# Patient Record
Sex: FEMALE | Race: WHITE | NOT HISPANIC OR LATINO | Employment: FULL TIME | URBAN - METROPOLITAN AREA
[De-identification: names, ages, dates, MRNs, and addresses within clinical notes are randomized per-mention and may not be internally consistent; named-entity substitution may affect disease eponyms.]

---

## 2024-07-23 PROBLEM — IMO0002 LUPUS: Status: ACTIVE | Noted: 2024-07-23

## 2024-07-23 PROBLEM — M32.9 LUPUS: Status: ACTIVE | Noted: 2024-07-23

## 2024-09-16 ENCOUNTER — TELEPHONE (OUTPATIENT)
Age: 44
End: 2024-09-16
Payer: COMMERCIAL

## 2024-09-18 ENCOUNTER — OFFICE VISIT (OUTPATIENT)
Age: 44
End: 2024-09-18
Payer: COMMERCIAL

## 2024-09-18 VITALS
WEIGHT: 218.8 LBS | HEIGHT: 63 IN | TEMPERATURE: 97.9 F | HEART RATE: 80 BPM | BODY MASS INDEX: 38.77 KG/M2 | DIASTOLIC BLOOD PRESSURE: 78 MMHG | SYSTOLIC BLOOD PRESSURE: 130 MMHG

## 2024-09-18 DIAGNOSIS — M65.4 TENDINITIS, DE QUERVAIN'S: ICD-10-CM

## 2024-09-18 DIAGNOSIS — IMO0002 LUPUS: Primary | ICD-10-CM

## 2024-09-18 RX ORDER — METOPROLOL SUCCINATE 100 MG/1
100 TABLET, EXTENDED RELEASE ORAL DAILY
COMMUNITY

## 2024-09-18 RX ORDER — HYDROXYCHLOROQUINE SULFATE 200 MG/1
200 TABLET, FILM COATED ORAL 2 TIMES DAILY
Qty: 180 TABLET | Refills: 1 | Status: SHIPPED | OUTPATIENT
Start: 2024-09-18 | End: 2025-01-20

## 2024-09-18 RX ORDER — METHYLPREDNISOLONE 4 MG/1
TABLET ORAL
Qty: 1 EACH | Refills: 1 | Status: SHIPPED | OUTPATIENT
Start: 2024-09-18

## 2024-09-18 RX ORDER — BUSPIRONE HYDROCHLORIDE 5 MG/1
TABLET ORAL
COMMUNITY

## 2024-09-18 NOTE — PROGRESS NOTES
[facility] [place] [facility] Follow Up Visit     Office Follow Up      Date: 09/18/2024   Patient Name: Lizette Proctor  MRN: [id number]  YOB: 1980    Referring Physician: Louis De Leon II*     No chief complaint on file.      History of Present Illness: Lizette Proctor is a 44 y.o. female who is here today for follow up on   History of Present Illness  The patient presents for evaluation of multiple medical concerns.    She experiences morning stiffness that lasts for 3 hours, typically subsiding by the time she arrives at work. Her pain level is manageable, peaking at a score of 3, and does not necessitate prescription pain medication. She continues her Plaquenil regimen. Her last lab tests were conducted 2 to 3 months ago.    She has been prescribed Synthroid by her primary care physician (PCP) due to thyroid issues and is scheduled for a follow-up in a month. Despite taking the lowest dose of Synthroid, she reports no noticeable improvement. She also takes an antacid, ensuring a 4-hour gap between it and the Synthroid.    She has been experiencing a lupus flare-up for over a month, with symptoms including fatigue, nausea, and rashes on her cheeks and the back of her neck. These symptoms are not sun-induced and have been intermittent for over a month. She has previously tried a low-dose corticosteroid cream without significant relief. She reports no pleurisy or recurrent nose or mouth ulcers. She can produce saliva and tears. In cold weather, her fingers and toes turn red or white, and her outer toes become numb. She reports no hair loss. She was diagnosed with lupus via a biopsy behind her right ear. She uses CeraVe 50 sunscreen on her face and arms, despite not having recent sun exposure. She believes anxiety contributes to her facial redness. She has tried Blue Lizard sunscreen and found it beneficial. Sun exposure exacerbates her sores, causing a burning  sensation even after just 3 minutes.    She reports frequent urination and bowel movements. She is not currently taking vitamin D supplements but plans to start soon. She had to reschedule her eye doctor appointment last week and is now seeking a new doctor in [place] or considering traveling to [place] for an OCT exam.    Over the past 3 weeks, she has noticed a change in her left second MCP joint. It remains swollen but the swelling has reduced and the pain is not constant. She reports tenderness in her thumb knuckle and slightly above it.       Result Review :        Results  Laboratory Studies  Hepatitis panel was negative. ARABLELA, lupus screen was negative. RNP, secondary lupus test was negative. CBC: white count was good, not anemic, platelets were fine. CMP: kidney function is great, liver functions were slightly elevated. Complements in DNA were normal. Urinalysis: no protein or blood. SSA, SSB and the Smith tests were negative. Thyroid was underactive. Vitamin D was low.          Subjective     Allergies   Allergen Reactions    Aripiprazole Rash         Current Outpatient Medications:     ARIPiprazole (ABILIFY) 5 MG tablet, Take 1 tablet by mouth Daily., Disp: , Rfl:     atomoxetine (STRATTERA) 40 MG capsule, Take 1 capsule by mouth Daily., Disp: , Rfl:     busPIRone (BUSPAR) 5 MG tablet, , Disp: , Rfl:     famotidine (PEPCID) 20 MG tablet, Take 1 tablet by mouth 2 (Two) Times a Day., Disp: , Rfl:     fluticasone (FLONASE) 50 MCG/ACT nasal spray, Administer 2 sprays into the nostril(s) as directed by provider Daily., Disp: , Rfl:     hydroxychloroquine (PLAQUENIL) 200 MG tablet, Take  by mouth Daily., Disp: , Rfl:     ibuprofen (ADVIL,MOTRIN) 200 MG tablet, Take 1 tablet by mouth Every 6 (Six) Hours As Needed for Mild Pain., Disp: , Rfl:     metoprolol succinate XL (TOPROL-XL) 100 MG 24 hr tablet, Take 1 tablet by mouth Daily., Disp: , Rfl:     ARIPiprazole (ABILIFY) 2 MG tablet, Take 1 tablet by mouth  Daily., Disp: , Rfl:     diphenhydrAMINE (BENADRYL) 25 mg capsule, Take 1 capsule by mouth Every 6 (Six) Hours As Needed for Itching. (Patient not taking: Reported on 9/18/2024), Disp: , Rfl:     FLUoxetine (PROzac) 20 MG capsule, Take 1 capsule by mouth Daily., Disp: , Rfl:     Ketotifen Fumarate (ZADITOR) 0.035 % solution, 2 (Two) Times a Day. (Patient not taking: Reported on 9/18/2024), Disp: , Rfl:     Past Medical History:   Diagnosis Date    ADHD     Alcohol abuse     Allergic rhinitis     Anxiety     Arthritis     Chronic pain     DDD (degenerative disc disease), cervical     GERD (gastroesophageal reflux disease)     Hypertension     Insomnia     Lupus     Panic disorder     Positive ARABELLA (antinuclear antibody)     Right knee pain     Tinnitus     Ulnar neuropathy         Past Surgical History:   Procedure Laterality Date    APPENDECTOMY      CHOLECYSTECTOMY      KNEE SURGERY      LASIK      TONSILLECTOMY         Family History   Problem Relation Age of Onset    Rheum arthritis Mother     Osteoarthritis Mother         Social History     Socioeconomic History    Marital status: Unknown   Tobacco Use    Smoking status: Every Day     Types: Cigarettes    Smokeless tobacco: Never   Vaping Use    Vaping status: Never Used   Substance and Sexual Activity    Alcohol use: Never    Drug use: Never    Sexual activity: Defer       Review of Systems   Constitutional:  Positive for fatigue. Negative for fever.   HENT:  Negative for mouth sores, nosebleeds, swollen glands and trouble swallowing.    Eyes:  Negative for blurred vision, double vision, photophobia, pain and visual disturbance.   Respiratory:  Negative for apnea, cough, choking and shortness of breath.    Cardiovascular:  Negative for chest pain, palpitations and leg swelling.        Edema  raynaud's   Gastrointestinal:  Positive for nausea and GERD. Negative for abdominal pain, blood in stool, constipation, diarrhea and vomiting.        Early satiety    "  Endocrine: Positive for cold intolerance and heat intolerance. Negative for polydipsia, polyphagia and polyuria.        Hot flashes   Genitourinary:  Negative for difficulty urinating, dysuria, genital sores, hematuria and urinary incontinence.   Musculoskeletal:  Positive for arthralgias, back pain and joint swelling. Negative for gait problem, myalgias, neck pain, neck stiffness and bursitis.        Joint tenderness  Morning stiffness     Skin:  Positive for skin lesions. Negative for rash.        Facial lupus rash     Allergic/Immunologic: Negative for environmental allergies and food allergies.   Neurological:  Negative for dizziness, tremors, seizures, syncope, weakness, numbness, headache, memory problem and confusion.   Hematological:  Negative for adenopathy. Bruises/bleeds easily.   Psychiatric/Behavioral:  Negative for sleep disturbance, suicidal ideas, depressed mood and stress. The patient is nervous/anxious.       I have reviewed and updated the patient's chief complaint, history of present illness, review of systems, past medical history, surgical history, family history, social history, medications and allergy list as appropriate.     Objective    Vital Signs:   Vitals:    09/18/24 1052   Weight: 99.2 kg (218 lb 12.8 oz)   Height: 160 cm (63\")   PainSc:   3     Lizette Proctor reports a pain score of 3.  Given her pain assessment as noted, treatment options were discussed and the following options were decided upon as a follow-up plan to address the patient's pain: .      Body mass index is 38.76 kg/m².        Physical Exam   Physical Exam  Patient is alert, no acute distress.  Head is normocephalic and atraumatic. Pupils are equal and round. Extraocular muscles are intact. Tongue is mildly coated and slightly dry.  Cervical range of motion is good.  Lungs are clear.  Heart rhythm is regular without rubs, gallops or murmurs.  Thoracic and lumbar spine are nontender. Shoulders are nontender with good " range of motion. Positive Finkelstein's test on the left first MCP down toward the CMC. Slight crepitus of the left knee. Knees, ankles and MTPs are nontender.  Significant erythematous rash noted on both cheeks and a patch at the base of the neck on the left side.    Physical Exam  There is currently no information documented on the homunculus. Go to the Rheumatology activity and [facility].     Results Review:   Imaging Results (Last 24 Hours)       ** No results found for the last 24 hours. **            Procedures    Assessment / Plan    Assessment/Plan:   There are no diagnoses linked to this encounter.      Assessment & Plan  1. Lupus.  Diagnosed by skin biopsy in the past. History of an ARABELLA of 1 to 80, although recently the ARABELLA was negative as were the secondary serologies and complements. She seems to have a response to Plaquenil with some improvement in her joints. She still has redness of the face and may need more aggressive treatment in the future if it does not calm down. She is advised to try Blue Lizard 100 SPF on her face. Continue Plaquenil twice a day. A dose pack will be given for today to help with the flare-up. CBC, CMP every 6 months and annual OCT eye exam are planned. She will check what is available to her in [place] for the OCT exam. If not, she will call us and we will refer her to [facility]. Inflammation tests, including sed rate and CRP, will be done.    2. Slight osteoarthritis of the knee on exam.  No specific treatment plan discussed.    3. De Quervain's tendinitis on the left.  Positive Finkelstein's test. An order for therapy will be given to have treatments to calm it down. An ambulatory referral to PT will be made.    4. Vitamin D deficiency.  Start 5000 units of D3 gel caps over the counter daily. Vitamin D levels will be rechecked, and she may need a prescription if levels are still low.    5. High-risk medication use on Plaquenil.  Plaquenil is  well tolerated and seems to be partially effective at this point. CBC, CMP every 6 months and annual OCT eye exam are planned.    Follow-up  The patient will follow up in 4 months.        Follow Up:   No follow-ups on file.       Bela Alejandro MD  [facility]     Encounter Administratively Closed by Health Information Management       Number Of Hemigard Strips Per Side: 1

## 2024-09-24 ENCOUNTER — TELEPHONE (OUTPATIENT)
Age: 44
End: 2024-09-24
Payer: COMMERCIAL

## 2024-09-24 NOTE — TELEPHONE ENCOUNTER
CALLED PATIENT REGARDING PHYSICAL THERAPY REFERRAL THAT WE RECEIVED.  JUST WANTED TO CHECK THE FACILITY SO THAT I COULD CHECK TO SEE IF WE NEEDED A PRIOR AUTHORIZATION.

## 2024-09-24 NOTE — TELEPHONE ENCOUNTER
Caller: Lizette Proctor    Relationship to patient: Self    Best call back number: [phone]     Patient is needing: RETURNING CALL FROM RICK IN REGARDS TO PT REFERRAL. PATIENT STATES THAT SHE DOES NOT HAVE A SPECIFIC FACILITY IN MIND. PLEASE CALL BACK TO ADVISE.

## 2024-10-03 NOTE — TELEPHONE ENCOUNTER
TRIED REACHING HER AGAIN TODAY TO SEE IF SHE FOUND A FACILITY FOR PHYSICAL THERAPY SO THAT I CAN OBTAIN THE RECORD(S), HER MAIL BOX WAS FULL.

## 2025-01-16 PROBLEM — M65.4 TENOSYNOVITIS, DE QUERVAIN: Status: ACTIVE | Noted: 2025-01-16

## 2025-01-16 PROBLEM — E55.9 VITAMIN D DEFICIENCY: Status: ACTIVE | Noted: 2025-01-16

## 2025-01-16 PROBLEM — Z79.899 ENCOUNTER FOR LONG-TERM (CURRENT) USE OF HIGH-RISK MEDICATION: Status: ACTIVE | Noted: 2025-01-16

## 2025-01-16 PROBLEM — M32.8 OTHER FORMS OF SYSTEMIC LUPUS ERYTHEMATOSUS: Status: ACTIVE | Noted: 2024-07-23

## 2025-01-20 RX ORDER — HYDROXYCHLOROQUINE SULFATE 200 MG/1
200 TABLET, FILM COATED ORAL 2 TIMES DAILY
Qty: 180 TABLET | Refills: 1 | Status: SHIPPED | OUTPATIENT
Start: 2025-01-20

## 2025-05-08 ENCOUNTER — TELEPHONE (OUTPATIENT)
Age: 45
End: 2025-05-08
Payer: COMMERCIAL

## 2025-05-12 NOTE — ASSESSMENT & PLAN NOTE
Diagnosed via skin biopsy previously.  History of ARABELLA: 1: 80 although recently ARABELLA was negative as were his secondary serologies and complements.  She seems to have a response to Plaquenil with good improvement to her joints of her hands  She still sees redness of the face, she utilizes blue lizard 100 SPF on her face sunscreen.  CBC, CMP every 6 months and annual OCT eye exam as planned.  Labs today.  Follow-up 6 months.    Orders:    Urinalysis With Culture If Indicated -; Future    Comprehensive Metabolic Panel; Future    C-reactive Protein; Future    Sedimentation Rate; Future    CBC (No Diff); Future    Protein / Creatinine Ratio, Urine - Urine, Clean Catch; Future

## 2025-05-12 NOTE — ASSESSMENT & PLAN NOTE
Plaquenil.  Stable.  Well-tolerated.  OTC exam: Currently needs scheduled-plans to do so today.  CBC, CMP every 6 months.  Labs today    Orders:    Comprehensive Metabolic Panel; Future    CBC (No Diff); Future

## 2025-05-12 NOTE — PROGRESS NOTES
Office Follow Up      Date: 05/13/2025   Patient Name: Lizette Proctor  MRN: [id number]  YOB: 1980    Referring Physician: No ref. provider found     Chief Complaint:   Chief Complaint   Patient presents with    Lupus       History of Present Illness:   Lizette Proctor is a 45 y.o. female who is here today for follow up for lupus and osteoarthritis.     She is a previous patient of Dr. Bela Alejandro, last appointment March 2024.  She continues on Plaquenil which she is tolerating well.  She believes the Plaquenil significantly helped the pain to her hands.  Patient is prescribed Synthroid per primary care physician due to thyroid abnormalities  Her lupus was diagnosed via biopsy behind her right ear.     Subjective     Review of Systems   Positive for fatigue,    Current Outpatient Medications:     ARIPiprazole (ABILIFY) 5 MG tablet, Take 1 tablet by mouth Daily., Disp: , Rfl:     atomoxetine (STRATTERA) 40 MG capsule, Take 1 capsule by mouth Daily., Disp: , Rfl:     cholecalciferol (Vitamin D) 25 MCG (1000 UT) tablet, Take 1 tablet by mouth Daily., Disp: , Rfl:     famotidine (PEPCID) 20 MG tablet, Take 1 tablet by mouth 2 (Two) Times a Day., Disp: , Rfl:     fluticasone (FLONASE) 50 MCG/ACT nasal spray, Administer 2 sprays into the nostril(s) as directed by provider Daily., Disp: , Rfl:     hydroxychloroquine (PLAQUENIL) 200 MG tablet, Take 1 tablet by mouth 2 (Two) Times a Day., Disp: 180 tablet, Rfl: 1    ibuprofen (ADVIL,MOTRIN) 200 MG tablet, Take 1 tablet by mouth Every 6 (Six) Hours As Needed for Mild Pain., Disp: , Rfl:     metoprolol succinate XL (TOPROL-XL) 100 MG 24 hr tablet, Take 1 tablet by mouth Daily., Disp: , Rfl:     omega-3 acid ethyl esters (LOVAZA) 1 g capsule, , Disp: , Rfl:     pantoprazole (PROTONIX) 40 MG EC tablet, , Disp: , Rfl:     rosuvastatin (CRESTOR) 10 MG tablet, , Disp: , Rfl:     sertraline (ZOLOFT) 50 MG tablet, , Disp: , Rfl:     Zepbound 2.5  "MG/0.5ML solution auto-injector, , Disp: , Rfl:     Allergies   Allergen Reactions    Aripiprazole Rash       I have reviewed and updated the patient's chief complaint, history of present illness, review of systems, past medical history, surgical history, family history, social history, medications and allergy list as appropriate.     Objective      Vitals:    05/13/25 1310   BP: 118/76   BP Location: Left arm   Pulse: 93   Temp: 97.2 °F (36.2 °C)   Weight: 102 kg (225 lb 4.8 oz)   Height: 160 cm (63\")   PainSc: 3    PainLoc: Back     Body mass index is 39.91 kg/m².      Physical Exam  Constitutional:       Appearance: Normal appearance.   HENT:      Head: Normocephalic.   Eyes:      Pupils: Pupils are equal, round, and reactive to light.   Cardiovascular:      Rate and Rhythm: Regular rhythm.      Pulses: Normal pulses.      Heart sounds: Normal heart sounds.   Pulmonary:      Effort: Pulmonary effort is normal.      Breath sounds: Normal breath sounds.   Musculoskeletal:      Cervical back: Normal range of motion and neck supple.      Comments: No active synovitis  Bilateral knee crepitus  Left shoulder crepitus     Skin:     General: Skin is warm.   Neurological:      General: No focal deficit present.      Mental Status: She is alert and oriented to person, place, and time.   Psychiatric:         Behavior: Behavior normal.         Thought Content: Thought content normal.          Procedures    Assessment / Plan      Assessment & Plan  Other forms of systemic lupus erythematosus, unspecified organ involvement status  Diagnosed via skin biopsy previously.  History of ARABELLA: 1: 80 although recently ARABELLA was negative as were his secondary serologies and complements.  She seems to have a response to Plaquenil with good improvement to her joints of her hands  She still sees redness of the face, she utilizes blue lizard 100 SPF on her face sunscreen.  CBC, CMP every 6 months and annual OCT eye exam as planned.  Labs " today.  Follow-up 6 months.    Orders:    Urinalysis With Culture If Indicated -; Future    Comprehensive Metabolic Panel; Future    C-reactive Protein; Future    Sedimentation Rate; Future    CBC (No Diff); Future    Protein / Creatinine Ratio, Urine - Urine, Clean Catch; Future    Vitamin D deficiency  History of low levels.  Will check today.    Orders:    Vitamin D,25-Hydroxy; Future    Tendinitis, de Quervain's  Positive Finkelstein's test.  Previous order for therapy  No complaints at this time       Encounter for long-term (current) use of high-risk medication  Plaquenil.  Stable.  Well-tolerated.  OTC exam: Currently needs scheduled-plans to do so today.  CBC, CMP every 6 months.  Labs today    Orders:    Comprehensive Metabolic Panel; Future    CBC (No Diff); Future    Osteoarthritis of both knees, unspecified osteoarthritis type  Per history.  Bilateral plica resection in high school  Plans to make appointment with orthopedics       Other fatigue  Fatigue reported at visit today  Check vitamin D levels       Chronic midline back pain, unspecified back location  Recently discussed with primary care provider, plans to make an appointment with orthopedics         Follow Up:   Return in about 6 months (around 11/13/2025) for NP's.      RADHA Qiu   [facility]

## 2025-05-13 ENCOUNTER — OFFICE VISIT (OUTPATIENT)
Age: 45
End: 2025-05-13
Payer: COMMERCIAL

## 2025-05-13 ENCOUNTER — LAB (OUTPATIENT)
Facility: HOSPITAL | Age: 45
End: 2025-05-13
Payer: COMMERCIAL

## 2025-05-13 VITALS
DIASTOLIC BLOOD PRESSURE: 76 MMHG | WEIGHT: 225.3 LBS | SYSTOLIC BLOOD PRESSURE: 118 MMHG | TEMPERATURE: 97.2 F | BODY MASS INDEX: 39.92 KG/M2 | HEART RATE: 93 BPM | HEIGHT: 63 IN

## 2025-05-13 DIAGNOSIS — G89.29 CHRONIC MIDLINE BACK PAIN, UNSPECIFIED BACK LOCATION: ICD-10-CM

## 2025-05-13 DIAGNOSIS — E55.9 VITAMIN D DEFICIENCY: ICD-10-CM

## 2025-05-13 DIAGNOSIS — Z79.899 ENCOUNTER FOR LONG-TERM (CURRENT) USE OF HIGH-RISK MEDICATION: ICD-10-CM

## 2025-05-13 DIAGNOSIS — M32.8 OTHER FORMS OF SYSTEMIC LUPUS ERYTHEMATOSUS, UNSPECIFIED ORGAN INVOLVEMENT STATUS: ICD-10-CM

## 2025-05-13 DIAGNOSIS — R53.83 OTHER FATIGUE: ICD-10-CM

## 2025-05-13 DIAGNOSIS — M32.8 OTHER FORMS OF SYSTEMIC LUPUS ERYTHEMATOSUS, UNSPECIFIED ORGAN INVOLVEMENT STATUS: Primary | ICD-10-CM

## 2025-05-13 DIAGNOSIS — M17.0 OSTEOARTHRITIS OF BOTH KNEES, UNSPECIFIED OSTEOARTHRITIS TYPE: ICD-10-CM

## 2025-05-13 DIAGNOSIS — M65.4 TENDINITIS, DE QUERVAIN'S: ICD-10-CM

## 2025-05-13 DIAGNOSIS — IMO0002 LUPUS: ICD-10-CM

## 2025-05-13 DIAGNOSIS — M54.9 CHRONIC MIDLINE BACK PAIN, UNSPECIFIED BACK LOCATION: ICD-10-CM

## 2025-05-13 LAB
BILIRUB UR QL STRIP: NEGATIVE
CLARITY UR: ABNORMAL
COLOR UR: YELLOW
CREAT UR-MCNC: 97.8 MG/DL
GLUCOSE UR STRIP-MCNC: NEGATIVE MG/DL
HGB UR QL STRIP.AUTO: NEGATIVE
HOLD SPECIMEN: NORMAL
KETONES UR QL STRIP: NEGATIVE
LEUKOCYTE ESTERASE UR QL STRIP.AUTO: NEGATIVE
NITRITE UR QL STRIP: NEGATIVE
PH UR STRIP.AUTO: >=9 [PH] (ref 5–8)
PROT ?TM UR-MCNC: 10.6 MG/DL
PROT UR QL STRIP: ABNORMAL
PROT/CREAT UR: 108.4 MG/G CREA (ref 0–200)
SP GR UR STRIP: 1.01 (ref 1–1.03)
UROBILINOGEN UR QL STRIP: ABNORMAL

## 2025-05-13 PROCEDURE — 82306 VITAMIN D 25 HYDROXY: CPT

## 2025-05-13 PROCEDURE — 82570 ASSAY OF URINE CREATININE: CPT

## 2025-05-13 PROCEDURE — 86255 FLUORESCENT ANTIBODY SCREEN: CPT

## 2025-05-13 PROCEDURE — 86140 C-REACTIVE PROTEIN: CPT

## 2025-05-13 PROCEDURE — 84156 ASSAY OF PROTEIN URINE: CPT

## 2025-05-13 PROCEDURE — 81001 URINALYSIS AUTO W/SCOPE: CPT

## 2025-05-13 PROCEDURE — 86038 ANTINUCLEAR ANTIBODIES: CPT

## 2025-05-13 PROCEDURE — 85652 RBC SED RATE AUTOMATED: CPT

## 2025-05-13 PROCEDURE — 36415 COLL VENOUS BLD VENIPUNCTURE: CPT

## 2025-05-13 PROCEDURE — 86160 COMPLEMENT ANTIGEN: CPT

## 2025-05-13 PROCEDURE — 81003 URINALYSIS AUTO W/O SCOPE: CPT

## 2025-05-13 PROCEDURE — 85027 COMPLETE CBC AUTOMATED: CPT

## 2025-05-13 PROCEDURE — 80053 COMPREHEN METABOLIC PANEL: CPT

## 2025-05-13 RX ORDER — PANTOPRAZOLE SODIUM 40 MG/1
TABLET, DELAYED RELEASE ORAL
COMMUNITY
Start: 2025-03-28

## 2025-05-13 RX ORDER — CHOLECALCIFEROL (VITAMIN D3) 25 MCG
1000 TABLET ORAL DAILY
COMMUNITY

## 2025-05-13 RX ORDER — IBUPROFEN 200 MG
200 TABLET ORAL EVERY 6 HOURS PRN
Qty: 360 TABLET | Refills: 1 | Status: CANCELLED | OUTPATIENT
Start: 2025-05-13

## 2025-05-13 RX ORDER — HYDROXYCHLOROQUINE SULFATE 200 MG/1
200 TABLET, FILM COATED ORAL 2 TIMES DAILY
Qty: 180 TABLET | Refills: 1 | Status: SHIPPED | OUTPATIENT
Start: 2025-05-13

## 2025-05-13 RX ORDER — TIRZEPATIDE 2.5 MG/.5ML
INJECTION, SOLUTION SUBCUTANEOUS
COMMUNITY
Start: 2025-05-05

## 2025-05-13 RX ORDER — OMEGA-3-ACID ETHYL ESTERS 1 G/1
CAPSULE, LIQUID FILLED ORAL
COMMUNITY
Start: 2025-02-25

## 2025-05-13 RX ORDER — ROSUVASTATIN CALCIUM 10 MG/1
TABLET, COATED ORAL
COMMUNITY
Start: 2025-02-10

## 2025-05-14 LAB
25(OH)D3 SERPL-MCNC: 45.5 NG/ML (ref 30–100)
ALBUMIN SERPL-MCNC: 4.2 G/DL (ref 3.5–5.2)
ALBUMIN/GLOB SERPL: 1.4 G/DL
ALP SERPL-CCNC: 77 U/L (ref 39–117)
ALT SERPL W P-5'-P-CCNC: 31 U/L (ref 1–33)
ANION GAP SERPL CALCULATED.3IONS-SCNC: 13 MMOL/L (ref 5–15)
AST SERPL-CCNC: 35 U/L (ref 1–32)
BACTERIA UR QL AUTO: ABNORMAL /HPF
BILIRUB SERPL-MCNC: 0.2 MG/DL (ref 0–1.2)
BILIRUB UR QL STRIP: NEGATIVE
BUN SERPL-MCNC: 4 MG/DL (ref 6–20)
BUN/CREAT SERPL: 6.2 (ref 7–25)
C3 SERPL-MCNC: 150 MG/DL (ref 82–167)
C4 SERPL-MCNC: 31 MG/DL (ref 14–44)
CALCIUM SPEC-SCNC: 8.9 MG/DL (ref 8.6–10.5)
CHLORIDE SERPL-SCNC: 100 MMOL/L (ref 98–107)
CLARITY UR: ABNORMAL
CO2 SERPL-SCNC: 25 MMOL/L (ref 22–29)
COLOR UR: YELLOW
CREAT SERPL-MCNC: 0.65 MG/DL (ref 0.57–1)
CRP SERPL-MCNC: 2.38 MG/DL (ref 0–0.5)
DEPRECATED RDW RBC AUTO: 41.4 FL (ref 37–54)
EGFRCR SERPLBLD CKD-EPI 2021: 110.1 ML/MIN/1.73
ERYTHROCYTE [DISTWIDTH] IN BLOOD BY AUTOMATED COUNT: 12.2 % (ref 12.3–15.4)
ERYTHROCYTE [SEDIMENTATION RATE] IN BLOOD: 22 MM/HR (ref 0–20)
GLOBULIN UR ELPH-MCNC: 3 GM/DL
GLUCOSE SERPL-MCNC: 78 MG/DL (ref 65–99)
GLUCOSE UR STRIP-MCNC: NEGATIVE MG/DL
HCT VFR BLD AUTO: 38.4 % (ref 34–46.6)
HGB BLD-MCNC: 13 G/DL (ref 12–15.9)
HGB UR QL STRIP.AUTO: NEGATIVE
HYALINE CASTS UR QL AUTO: ABNORMAL /LPF
KETONES UR QL STRIP: NEGATIVE
LEUKOCYTE ESTERASE UR QL STRIP.AUTO: NEGATIVE
MCH RBC QN AUTO: 32.3 PG (ref 26.6–33)
MCHC RBC AUTO-ENTMCNC: 33.9 G/DL (ref 31.5–35.7)
MCV RBC AUTO: 95.3 FL (ref 79–97)
NITRITE UR QL STRIP: NEGATIVE
PH UR STRIP.AUTO: >=9 [PH] (ref 5–8)
PLATELET # BLD AUTO: 344 10*3/MM3 (ref 140–450)
PMV BLD AUTO: 9.8 FL (ref 6–12)
POTASSIUM SERPL-SCNC: 4 MMOL/L (ref 3.5–5.2)
PROT SERPL-MCNC: 7.2 G/DL (ref 6–8.5)
PROT UR QL STRIP: ABNORMAL
RBC # BLD AUTO: 4.03 10*6/MM3 (ref 3.77–5.28)
RBC # UR STRIP: ABNORMAL /HPF
REF LAB TEST METHOD: ABNORMAL
SODIUM SERPL-SCNC: 138 MMOL/L (ref 136–145)
SP GR UR STRIP: 1.01 (ref 1–1.03)
SQUAMOUS #/AREA URNS HPF: ABNORMAL /HPF
UROBILINOGEN UR QL STRIP: ABNORMAL
WBC # UR STRIP: ABNORMAL /HPF
WBC NRBC COR # BLD AUTO: 11.81 10*3/MM3 (ref 3.4–10.8)

## 2025-05-16 LAB
ANA HOMOGEN TITR SER: ABNORMAL {TITER}
ANA SER QL IF: POSITIVE
Lab: ABNORMAL

## 2025-05-18 LAB — DSDNA AB SER QL CLIF: NEGATIVE
